# Patient Record
Sex: FEMALE | Race: OTHER | URBAN - METROPOLITAN AREA
[De-identification: names, ages, dates, MRNs, and addresses within clinical notes are randomized per-mention and may not be internally consistent; named-entity substitution may affect disease eponyms.]

---
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
## 2024-05-30 NOTE — ED PEDIATRIC TRIAGE NOTE - TEMP(CELSIUS)
Patient accepted with [facility], [facility] 24-48 hours after d/c.    CM continues to follow and monitor for needs.     Patient is clear to d/c from CM to home with JACK, [facility], [facility] 24-48 hours after dc.    CM met with patient to notify, patient became upset and stated that the attending told her she could stay till tomorrow, Attending contacted, he said that he would not cancel DC and that patient was now a CM problem.  Patient stated no one was at home to be with her, her daughter normally is, patient contacted her daughter with CM in room, stated she was looking for a ride for patient as her car had broken down.  CM explained to both patient and daughter that she could receive a medicaid cab, patient stated she did not want to ride in a car with anyone she didn't know, daughter told her she might not have a choice, patient stated she has her clothes in her closet, patient notified of her right to appeal.    CM discussed a walker with patient, patient has a walker at home, does not needs a new one at this time.    Transition of Care Plan:    RUR: 19%  Prior Level of Functioning: assistance  Disposition: HH  If SNF or IPR: Date FOC offered:   Date FOC received:   Accepting facility[facility]  Date authorization started with reference number:   Date authorization received and expires:   Follow up appointments: yes  DME needed: walker  Transportation at discharge: daughter or medicaid cab  IM/IMM Medicare/ letter given: yes, refused to sign  Is patient a [place] and connected with [facility]?    If yes, was [place] transfer form completed and [facility] notified?   Caregiver Contact:   Discharge Caregiver contacted prior to discharge? Patient and daughter aware  Care Conference needed?   Barriers to discharge: transport.   Primary Decision Maker: JoseWen - Child - [phone]    Primary Decision Maker: Genevieve Christian - Child - [phone]    Primary Decision Maker: JoseZelalem - Child - [phone]  Click here to complete Healthcare Decision Makers including selection of the Healthcare Decision Maker Relationship (ie \"Primary\").       Content/Action Overview:  Has NO ACP documents-Information provided  Reviewed DNR/DNI and patient elects Full Code (Attempt Resuscitation)        Length of Voluntary ACP Conversation in minutes:  <16 minutes (Non-Billable)    JENNY Velazquez              36.7